# Patient Record
Sex: FEMALE | HISPANIC OR LATINO | Employment: FULL TIME | URBAN - METROPOLITAN AREA
[De-identification: names, ages, dates, MRNs, and addresses within clinical notes are randomized per-mention and may not be internally consistent; named-entity substitution may affect disease eponyms.]

---

## 2022-06-13 ENCOUNTER — OFFICE VISIT (OUTPATIENT)
Dept: PLASTIC SURGERY | Facility: CLINIC | Age: 27
End: 2022-06-13

## 2022-06-13 VITALS — BODY MASS INDEX: 27.47 KG/M2 | TEMPERATURE: 97.9 F | WEIGHT: 175 LBS | HEIGHT: 67 IN

## 2022-06-13 DIAGNOSIS — Q67.0 FACIAL ASYMMETRY: Primary | ICD-10-CM

## 2022-06-13 PROCEDURE — 99203 OFFICE O/P NEW LOW 30 MIN: CPT | Performed by: PHYSICIAN ASSISTANT

## 2022-06-13 NOTE — PROGRESS NOTES
Assessment/Plan:    Pt is a 23 YOF who presents to the office as a new patient for evaluation of facial asymmetry  Please see HPI  The patient was assessed by myself and Dr Shawn Wagner   She does not have functional issues or pain  Patient is most concerned about cosmetic asymmetry of her right and left cheeks  Dr Shawn Wagner informed the patient that she could undergo fat grafting to the area  The patient reports that she will consider surgical options and return to the office as needed  No problem-specific Assessment & Plan notes found for this encounter  Diagnoses and all orders for this visit:    Facial asymmetry    Other orders  -     Prenatal Vit-DSS-Fe Cbn-FA (PRENATAL AD PO); Take 1 tablet by mouth daily            Subjective:      Patient ID: Andrea Vences is a 23 y o  female  HPI    The following portions of the patient's history were reviewed and updated as appropriate:   She  has no past medical history on file  She   Patient Active Problem List    Diagnosis Date Noted    Facial asymmetry 06/13/2022     She  has no past surgical history on file  Her family history is not on file  She  reports that she has never smoked  She has never used smokeless tobacco  She reports that she does not drink alcohol and does not use drugs  Current Outpatient Medications   Medication Sig Dispense Refill    Prenatal Vit-DSS-Fe Cbn-FA (PRENATAL AD PO) Take 1 tablet by mouth daily       No current facility-administered medications for this visit       Review of Systems    A 12 point ROS was was completed and is negative except for as in HPI    Objective:      Temp 97 9 °F (36 6 °C)   Ht 5' 6 93" (1 7 m)   Wt 79 4 kg (175 lb)   BMI 27 47 kg/m²          Physical Exam  Vitals and nursing note reviewed  Constitutional:       General: She is not in acute distress  Appearance: Normal appearance  She is normal weight  She is not ill-appearing  HENT:      Head: Atraumatic        Comments: See HPI     Nose: Nose normal       Mouth/Throat:      Mouth: Mucous membranes are moist    Eyes:      Extraocular Movements: Extraocular movements intact  Conjunctiva/sclera: Conjunctivae normal       Pupils: Pupils are equal, round, and reactive to light  Neck:      Vascular: No carotid bruit  Cardiovascular:      Rate and Rhythm: Normal rate and regular rhythm  Pulses: Normal pulses  Heart sounds: Normal heart sounds  Pulmonary:      Effort: Pulmonary effort is normal  No respiratory distress  Breath sounds: Normal breath sounds  Abdominal:      General: Abdomen is flat  Palpations: Abdomen is soft  Musculoskeletal:         General: No swelling, tenderness, deformity or signs of injury  Normal range of motion  Cervical back: Normal range of motion and neck supple  No rigidity or tenderness  Lymphadenopathy:      Cervical: No cervical adenopathy  Skin:     General: Skin is warm and dry  Neurological:      General: No focal deficit present  Mental Status: She is alert and oriented to person, place, and time  Cranial Nerves: No cranial nerve deficit  Sensory: No sensory deficit  Motor: No weakness     Psychiatric:         Mood and Affect: Mood normal          Behavior: Behavior normal